# Patient Record
Sex: FEMALE | Race: WHITE | Employment: OTHER | URBAN - METROPOLITAN AREA
[De-identification: names, ages, dates, MRNs, and addresses within clinical notes are randomized per-mention and may not be internally consistent; named-entity substitution may affect disease eponyms.]

---

## 2022-11-07 NOTE — PROGRESS NOTES
[facility]   Card[facility] Consultation    Referring Provider:  No primary care provider on file. Chief Complaint   Patient presents with    New Patient    Hypertension    Shortness of Breath          History of Present Illness:  Benjamin Lacey is a 78 y.o. female who has history of CVA, HTN, hip replacement. 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 She is a non-smoker, drinks socially. She was a nurse. Today, Benjamin Lacey is a new patient here for aortic ectasia referred by Dr Jeanna Smith (in ). She sees cardiologist in [place]. When she had her stroke, she was taking a walk with a friend and suddenly experienced aphasia. She is concerned about starting hormone therapy due to her cardiac history. She had MRI - which showed 4.2 cm ascending aorta . Past Medical History:   has a past medical history of Cancer [facility]), Cerebral artery occlusion with cerebral infarction ([facility] [address] and Hypertension. Surgical History:   has a past surgical history that includes  section; Cataract extraction bilateral w/ anterior vitrectomy; joint replacement (Right); and back surgery. Social History:   reports that she has never smoked. She has never used smokeless tobacco. She reports current alcohol use. She reports that she does not use drugs. Family History:  family history includes Heart Attack in her maternal aunt; Stroke in her father, mother, and sister. Home Medications:  Prior to Admission medications    Medication Sig Start Date End Date Taking?  Authorizing Provider   acetaminophen (TYLENOL) 325 MG tablet as needed 21  Yes Historical Provider, MD   amLODIPine (NORVASC) 5 MG tablet daily 22  Yes Historical Provider, MD   atorvastatin (LIPITOR) 40 MG tablet TAKE 1 TABLET BY MOUTH EVERYDAY AT BEDTIME 10/15/22  Yes Historical Provider, MD   Calcium Carbonate-Vitamin D 600-200 MG-UNIT CAPS 1 capsule   Yes Historical Provider, MD   Multiple Vitamins-Minerals (CENTRUM SILVER 50+WOMEN) TABS Centrum Silver 50+Women Oral Tablet QTY: 0 tablet Days: 0 Refills: 0  Written: 07/30/21 Patient Instructions: 7/30/21  Yes Historical Provider, MD   ramipril (ALTACE) 10 MG capsule TAKE 1 CAPSULE BY MOUTH TWICE A DAY 9/4/22  Yes Historical Provider, MD   clopidogrel (PLAVIX) 75 MG tablet TAKE 1 TABLET BY MOUTH EVERY DAY  Patient not taking: Reported on 11/17/2022 9/9/22   Historical Provider, MD   HYDROcodone-acetaminophen (NORCO) 5-325 MG per tablet TAKE 1 TABLET BY MOUTH EVERY 6 (SIX) HOURS AS NEEDED (AS NEEDED FOR PAIN) FOR UP TO 7 DAYS. Patient not taking: Reported on 11/17/2022 11/8/22   Historical Provider, MD        Allergies:  Cephalexin, Contrast [barium-containing compounds], Shellfish allergy, Iodine, Penicillins, Sulfa antibiotics, and Tubersol [tuberculin ppd]     Review of Systems:   Constitutional: there has been no unanticipated weight loss. There's been no change in energy level, sleep pattern, or activity level. Eyes: No visual changes or diplopia. No scleral icterus. ENT: No Headaches, hearing loss or vertigo. No mouth sores or sore throat. Cardiovascular: Reviewed in HPI  Respiratory: No cough or wheezing, no sputum production. No hematemesis. Gastrointestinal: No abdominal pain, appetite loss, blood in stools. No change in bowel or bladder habits. Genitourinary: No dysuria, trouble voiding, or hematuria. Musculoskeletal:  No gait disturbance, weakness or joint complaints. Integumentary: No rash or pruritis. Neurological: No headache, diplopia, change in muscle strength, numbness or tingling. No change in gait, balance, coordination, mood, affect, memory, mentation, behavior. Psychiatric: No anxiety, no depression. Endocrine: No malaise, fatigue or temperature intolerance. No excessive thirst, fluid intake, or urination. No tremor.   Hematologic/Lymphatic: No abnormal bruising or bleeding, blood clots or swollen lymph nodes. Allergic/Immunologic: No nasal congestion or hives. Physical Examination:    Vitals:    11/17/22 1328   BP: 132/68   Pulse: 77   SpO2: 97%        Wt Readings from Last 1 Encounters:   11/17/22 188 lb 9.6 oz (85.5 kg)       Constitutional and General Appearance: NAD   Skin:good turgor,intact without lesions  HEENT: EOMI ,normal  Neck:no JVD     Respiratory:  Normal excursion and expansion without use of accessory muscles  Resp Auscultation: Normal breath sounds without dullness  Cardiovascular: The apical impulses not displaced  Heart tones are crisp and normal  Cervical veins are not engorged  The carotid upstroke is normal in amplitude and contour without delay or bruit  Peripheral pulses are symmetrical and full  There is no clubbing, cyanosis of the extremities. No edema  Femoral Arteries: 2+ and equal  Pedal Pulses: 2+ and equal   Abdomen:  No masses or tenderness  Liver/Spleen: No Abnormalities Noted  Neurological/Psychiatric:  Alert and oriented in all spheres  Moves all extremities well  Exhibits normal gait balance and coordination  No abnormalities of mood, affect, memory, mentation, or behavior are noted      Assessment    Aortic ectasia  Oct 27, 2022 echo result reviewed by me > based on results, no danger of rupture at this time  MRI - showed 4.2 cm ascending aorta  Recommend CT of chest to reassess aorta in 6 mos (early May)    Hypertension  /68 (Site: Left Upper Arm, Position: Sitting, Cuff Size: Medium Adult)   Pulse 77   Ht 5' 4\" (1.626 m)   Wt 188 lb 9.6 oz (85.5 kg)   SpO2 97%   BMI 32.37 kg/m²   Continue on amlodipine, ramipril     CVA - treated with tPA  resume Plavix  Continue atorvastatin        Plan:    Cardiac test and lab results personally reviewed by me during this office visit and discussed. Resume Plavix  Renal before CT and echo  CT of chest to assess aorta (early May)  Echocardiogram in 6 mos (early May)  Continue risk factor modifications.    Call for any change in symptoms, call to report any changes in shortness of breath or development of chest pain with activity. Follow up later May 2023        I appreciate the opportunity of cooperating in the care of this individual.    Estrella Paredes. Deandre Garcia M.D., [facility]    Patient's problem list, medications, allergies, past medical, surgical, social and family histories were reviewed and updated as appropriate. Scribe's attestation: This note was scribed in the presence of Dr Deandre Garcia by Omer Landaverde RN. The scribe's documentation has been prepared under my direction and personally reviewed by me in its entirety. I confirm that the note above accurately reflects all work, treatment, procedures, and medical decision making performed by me.

## 2022-11-08 PROBLEM — I10 HYPERTENSION: Status: ACTIVE | Noted: 2022-11-08

## 2022-11-08 PROBLEM — I77.819 AORTIC ECTASIA (HCC): Status: ACTIVE | Noted: 2022-11-08

## 2022-11-08 PROBLEM — I63.9 CVA (CEREBRAL VASCULAR ACCIDENT) (HCC): Status: ACTIVE | Noted: 2022-11-08

## 2022-11-14 ENCOUNTER — TELEPHONE (OUTPATIENT)
Dept: CARDIOLOGY CLINIC | Age: 77
End: 2022-11-14

## 2022-11-14 NOTE — TELEPHONE ENCOUNTER
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    Pls advise thank you       On license of [facility]   [address]: [phone]  F:    [phone]    Dr Brennan Screen [address] [place] [address]  Ph. [phone]

## 2022-11-14 NOTE — TELEPHONE ENCOUNTER
Testing requested from [facility] the [facility] via fax. Fax confirmation received.

## 2022-11-17 ENCOUNTER — OFFICE VISIT (OUTPATIENT)
Dept: CARDIOLOGY CLINIC | Age: 77
End: 2022-11-17
Payer: MEDICARE

## 2022-11-17 VITALS
OXYGEN SATURATION: 97 % | BODY MASS INDEX: 32.2 KG/M2 | HEIGHT: 64 IN | DIASTOLIC BLOOD PRESSURE: 68 MMHG | HEART RATE: 77 BPM | SYSTOLIC BLOOD PRESSURE: 132 MMHG | WEIGHT: 188.6 LBS

## 2022-11-17 DIAGNOSIS — I77.819 AORTIC ECTASIA (HCC): Primary | ICD-10-CM

## 2022-11-17 DIAGNOSIS — I63.9 CEREBROVASCULAR ACCIDENT (CVA), UNSPECIFIED MECHANISM (HCC): ICD-10-CM

## 2022-11-17 DIAGNOSIS — I10 HYPERTENSION, UNSPECIFIED TYPE: ICD-10-CM

## 2022-11-17 PROBLEM — G47.33 OBSTRUCTIVE SLEEP APNEA SYNDROME: Status: ACTIVE | Noted: 2021-08-25

## 2022-11-17 PROCEDURE — 1123F ACP DISCUSS/DSCN MKR DOCD: CPT | Performed by: INTERNAL MEDICINE

## 2022-11-17 PROCEDURE — G8484 FLU IMMUNIZE NO ADMIN: HCPCS | Performed by: INTERNAL MEDICINE

## 2022-11-17 PROCEDURE — G8417 CALC BMI ABV UP PARAM F/U: HCPCS | Performed by: INTERNAL MEDICINE

## 2022-11-17 PROCEDURE — 1090F PRES/ABSN URINE INCON ASSESS: CPT | Performed by: INTERNAL MEDICINE

## 2022-11-17 PROCEDURE — 3078F DIAST BP <80 MM HG: CPT | Performed by: INTERNAL MEDICINE

## 2022-11-17 PROCEDURE — G8427 DOCREV CUR MEDS BY ELIG CLIN: HCPCS | Performed by: INTERNAL MEDICINE

## 2022-11-17 PROCEDURE — 93000 ELECTROCARDIOGRAM COMPLETE: CPT | Performed by: INTERNAL MEDICINE

## 2022-11-17 PROCEDURE — 1036F TOBACCO NON-USER: CPT | Performed by: INTERNAL MEDICINE

## 2022-11-17 PROCEDURE — 99214 OFFICE O/P EST MOD 30 MIN: CPT | Performed by: INTERNAL MEDICINE

## 2022-11-17 PROCEDURE — 3017F COLORECTAL CA SCREEN DOC REV: CPT | Performed by: INTERNAL MEDICINE

## 2022-11-17 PROCEDURE — 3075F SYST BP GE 130 - 139MM HG: CPT | Performed by: INTERNAL MEDICINE

## 2022-11-17 PROCEDURE — G8400 PT W/DXA NO RESULTS DOC: HCPCS | Performed by: INTERNAL MEDICINE

## 2022-11-17 RX ORDER — CLOPIDOGREL BISULFATE 75 MG/1
TABLET ORAL
Qty: 90 TABLET | Refills: 3 | Status: SHIPPED | OUTPATIENT
Start: 2022-11-17

## 2022-11-17 RX ORDER — ACETAMINOPHEN 325 MG/1
TABLET ORAL PRN
COMMUNITY
Start: 2021-07-30

## 2022-11-17 RX ORDER — ATORVASTATIN CALCIUM 40 MG/1
TABLET, FILM COATED ORAL
COMMUNITY
Start: 2022-10-15

## 2022-11-17 RX ORDER — HYDROCODONE BITARTRATE AND ACETAMINOPHEN 5; 325 MG/1; MG/1
TABLET ORAL
COMMUNITY
Start: 2022-11-08

## 2022-11-17 RX ORDER — AMLODIPINE BESYLATE 5 MG/1
TABLET ORAL DAILY
COMMUNITY
Start: 2022-09-04

## 2022-11-17 RX ORDER — CLOPIDOGREL BISULFATE 75 MG/1
TABLET ORAL
COMMUNITY
Start: 2022-09-09 | End: 2022-11-17

## 2022-11-17 RX ORDER — RAMIPRIL 10 MG/1
CAPSULE ORAL
COMMUNITY
Start: 2022-03-02 | End: 2022-11-17 | Stop reason: SDUPTHER

## 2022-11-17 RX ORDER — AMLODIPINE BESYLATE 5 MG/1
TABLET ORAL
COMMUNITY
Start: 2022-03-02 | End: 2022-11-17 | Stop reason: SDUPTHER

## 2022-11-17 RX ORDER — CLOPIDOGREL BISULFATE 75 MG/1
TABLET ORAL
COMMUNITY
Start: 2022-04-14 | End: 2022-11-17 | Stop reason: SDUPTHER

## 2022-11-17 RX ORDER — ATORVASTATIN CALCIUM 40 MG/1
TABLET, FILM COATED ORAL
COMMUNITY
Start: 2022-03-02 | End: 2022-11-17 | Stop reason: SDUPTHER

## 2022-11-17 RX ORDER — RAMIPRIL 5 MG/1
1 CAPSULE ORAL
COMMUNITY
End: 2022-11-17 | Stop reason: SDUPTHER

## 2022-11-17 RX ORDER — RAMIPRIL 10 MG/1
CAPSULE ORAL
COMMUNITY
Start: 2022-09-04

## 2022-11-17 NOTE — PATIENT INSTRUCTIONS
Resume Plavix  Renal before CT and echo  CT of chest to assess aorta (early May 2023)  Echo (early May 2023)  Continue risk factor modifications. Call for any change in symptoms, call to report any changes in shortness of breath or development of chest pain with activity.     Follow up in 6 mos

## 2023-11-27 RX ORDER — CLOPIDOGREL BISULFATE 75 MG/1
TABLET ORAL
Qty: 90 TABLET | Refills: 0 | Status: SHIPPED | OUTPATIENT
Start: 2023-11-27

## 2023-11-27 NOTE — TELEPHONE ENCOUNTER
Received refill request for plavix from [facility] pharmacy.    Last ov: 11/17/2022 LES    Last Refill: 11/17/2022 #90 w/ 3 refills    Next appointment: 06/02/2023 LES

## 2023-11-28 NOTE — TELEPHONE ENCOUNTER
Called pt, could not leave message mailbox not set up.    Pt CX appt  June 2023, stating they are moving to [place].

## 2024-02-27 RX ORDER — CLOPIDOGREL BISULFATE 75 MG/1
TABLET ORAL
Qty: 90 TABLET | Refills: 0 | OUTPATIENT
Start: 2024-02-27

## 2024-02-27 NOTE — TELEPHONE ENCOUNTER
Received refill request for plavix from [facility] pharmacy.    Last ov: 2022    Last Refill: 11/27/2023 #90 w/ 0     Next appointment: none

## 2024-02-27 NOTE — TELEPHONE ENCOUNTER
I called the phone number listed in chart.  Unable to complete call or the phone number has been disconnected.      I called and spoke to [facility] to get an updated phone number.  [phone].      I called and spoke to Kelli to notify that we received a refill request for Plavix.  She stated that it should have went to Dr. Virk down there.  Advised that I would refuse the refill request that was received and requested for her to reach out to [facility] and notify that they sent it to the wrong Provider.  She verbalized understanding and denied questions and/or concerns.